# Patient Record
Sex: FEMALE | Race: WHITE | NOT HISPANIC OR LATINO | URBAN - METROPOLITAN AREA
[De-identification: names, ages, dates, MRNs, and addresses within clinical notes are randomized per-mention and may not be internally consistent; named-entity substitution may affect disease eponyms.]

---

## 2019-11-02 ENCOUNTER — HOSPITAL ENCOUNTER (EMERGENCY)
Facility: HOSPITAL | Age: 24
Discharge: HOME | End: 2019-11-02
Attending: EMERGENCY MEDICINE
Payer: OTHER GOVERNMENT

## 2019-11-02 VITALS
RESPIRATION RATE: 20 BRPM | TEMPERATURE: 97 F | SYSTOLIC BLOOD PRESSURE: 159 MMHG | HEART RATE: 97 BPM | WEIGHT: 240 LBS | DIASTOLIC BLOOD PRESSURE: 91 MMHG
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
## 2019-11-02 PROCEDURE — 99284 EMERGENCY DEPT VISIT MOD MDM: CPT

## 2019-11-02 RX ORDER — CLINDAMYCIN HYDROCHLORIDE 300 MG/1
300 CAPSULE ORAL EVERY 6 HOURS
Qty: 28 CAPSULE | Refills: 0 | Status: SHIPPED | OUTPATIENT
Start: 2019-11-02 | End: 2019-11-09

## 2019-11-02 RX ORDER — MUPIROCIN 20 MG/G
OINTMENT TOPICAL 3 TIMES DAILY
Qty: 15 G | Refills: 0 | OUTPATIENT
Start: 2019-11-02 | End: 2021-12-07

## 2019-11-02 NOTE — DISCHARGE INSTRUCTIONS
**Follow up with PCP in 24-48 hours. Return to ER with worsening of symptoms. Wash area twice a day with antibacterial soap and water. Apply antibiotic ointment three times a day. Keep area clean. Take all antibiotics. Monitor for signs of infection such as redness, swelling, purulent discharge, or fever.     **Over the counter tylenol or motrin as needed for pain and/or fever as directed on package insert. Drink plenty fluids. Get plenty rest. Wash hands frequently.     **Our goal in the emergency department is to always give you outstanding care and exceptional service. You may receive a survey by mail or e-mail in the next week regarding your experience in our ED. We would greatly appreciate your completing and returning the survey. Your feedback provides us with a way to recognize our staff who give very good care and it helps us learn how to improve when your experience was below our aspiration of excellence.

## 2019-11-02 NOTE — ED TRIAGE NOTES
24 y.o. female presents to ER ED 03 /ED 03A   Chief Complaint   Patient presents with    Insect Bite     abdomen   Pt reports possible insect bite first noticed last Saturday on her abdomen. Pt reports the bite opened up two days ago, and now looks like a hole. Pt is 1 month s/p  and is breast feeding. No acute distress noted.

## 2019-11-02 NOTE — ED PROVIDER NOTES
Encounter Date: 11/2/2019       History     Chief Complaint   Patient presents with    Insect Bite     abdomen     24-year-old female presents with possible insect bite to her left lower abdomen.  She reports that she first noted the wound 1 week ago.  She states that 2 days ago it began draining.  Reports mild erythema and tenderness with palpation.  No fever.    The history is provided by the patient.     Review of patient's allergies indicates:  No Known Allergies  No past medical history on file.  No past surgical history on file.  No family history on file.  Social History     Tobacco Use    Smoking status: Not on file   Substance Use Topics    Alcohol use: Not on file    Drug use: Not on file     Review of Systems   Constitutional: Negative for fever.   HENT: Negative for congestion, ear pain, rhinorrhea, sore throat and trouble swallowing.    Eyes: Negative for pain and redness.   Respiratory: Negative for cough and shortness of breath.    Cardiovascular: Negative for chest pain.   Gastrointestinal: Negative for abdominal pain.   Genitourinary: Negative for difficulty urinating and dysuria.   Musculoskeletal: Negative for arthralgias, back pain, myalgias and neck pain.   Skin: Positive for wound. Negative for rash.   Neurological: Negative for seizures, weakness and headaches.   Psychiatric/Behavioral: Negative.        Physical Exam     Initial Vitals [11/02/19 0921]   BP Pulse Resp Temp SpO2   (!) 159/91 97 20 97.2 °F (36.2 °C) --      MAP       --         Physical Exam    Nursing note and vitals reviewed.  Constitutional: No distress.   HENT:   Head: Normocephalic and atraumatic.   Right Ear: External ear normal.   Left Ear: External ear normal.   Nose: Nose normal.   Mouth/Throat: Oropharynx is clear and moist.   Eyes: Conjunctivae, EOM and lids are normal. Pupils are equal, round, and reactive to light.   Neck: Neck supple.   Cardiovascular: Normal rate, regular rhythm, S1 normal, S2 normal, normal  heart sounds and intact distal pulses.   Pulmonary/Chest: Effort normal and breath sounds normal. No respiratory distress.   Abdominal: Soft. Bowel sounds are normal. There is no tenderness.   Musculoskeletal: Normal range of motion.   Neurological: She is alert and oriented to person, place, and time. She has normal strength. GCS eye subscore is 4. GCS verbal subscore is 5. GCS motor subscore is 6.   Skin: Skin is warm and dry. Capillary refill takes less than 2 seconds. No rash noted. There is erythema.        Psychiatric: She has a normal mood and affect. Her speech is normal and behavior is normal.         ED Course   Procedures                                      Clinical Impression:       ICD-10-CM ICD-9-CM   1. Bug bite with infection, initial encounter W57.XXXA 919.5     E906.4     New Prescriptions    CLINDAMYCIN (CLEOCIN) 300 MG CAPSULE    Take 1 capsule (300 mg total) by mouth every 6 (six) hours. for 7 days    MUPIROCIN (BACTROBAN) 2 % OINTMENT    Apply topically 3 (three) times daily.       Disposition:   Disposition: Discharged  Condition: Stable    The patient acknowledges that close follow up with medical provider is required. Instructed to follow up with PCP within 2 days. Patient was given specific return precautions. The patient agrees to comply with all instruction and directions given in the ER.                         Kaylee Hendricks NP  11/02/19 0957

## 2021-05-09 ENCOUNTER — OFFICE VISIT (OUTPATIENT)
Dept: URGENT CARE | Facility: CLINIC | Age: 26
End: 2021-05-09
Payer: OTHER GOVERNMENT

## 2021-05-09 VITALS
HEART RATE: 88 BPM | SYSTOLIC BLOOD PRESSURE: 150 MMHG | WEIGHT: 225 LBS | HEIGHT: 64 IN | OXYGEN SATURATION: 98 % | TEMPERATURE: 99 F | DIASTOLIC BLOOD PRESSURE: 73 MMHG | BODY MASS INDEX: 38.41 KG/M2 | RESPIRATION RATE: 16 BRPM

## 2021-05-09 DIAGNOSIS — R09.81 NASAL CONGESTION: ICD-10-CM

## 2021-05-09 DIAGNOSIS — R05.9 COUGH: Primary | ICD-10-CM

## 2021-05-09 DIAGNOSIS — J30.9 ALLERGIC RHINITIS WITH POSTNASAL DRIP: ICD-10-CM

## 2021-05-09 DIAGNOSIS — R09.82 ALLERGIC RHINITIS WITH POSTNASAL DRIP: ICD-10-CM

## 2021-05-09 DIAGNOSIS — J01.00 ACUTE MAXILLARY SINUSITIS, RECURRENCE NOT SPECIFIED: ICD-10-CM

## 2021-05-09 DIAGNOSIS — J02.9 SORE THROAT: ICD-10-CM

## 2021-05-09 LAB
CTP QC/QA: YES
CTP QC/QA: YES
MOLECULAR STREP A: NEGATIVE
SARS-COV-2 RDRP RESP QL NAA+PROBE: NEGATIVE

## 2021-05-09 PROCEDURE — U0002 COVID-19 LAB TEST NON-CDC: HCPCS | Mod: QW,S$GLB,, | Performed by: PHYSICIAN ASSISTANT

## 2021-05-09 PROCEDURE — 99203 PR OFFICE/OUTPT VISIT, NEW, LEVL III, 30-44 MIN: ICD-10-PCS | Mod: S$GLB,,, | Performed by: PHYSICIAN ASSISTANT

## 2021-05-09 PROCEDURE — 87651 POCT STREP A MOLECULAR: ICD-10-PCS | Mod: QW,S$GLB,, | Performed by: PHYSICIAN ASSISTANT

## 2021-05-09 PROCEDURE — U0002: ICD-10-PCS | Mod: QW,S$GLB,, | Performed by: PHYSICIAN ASSISTANT

## 2021-05-09 PROCEDURE — 87651 STREP A DNA AMP PROBE: CPT | Mod: QW,S$GLB,, | Performed by: PHYSICIAN ASSISTANT

## 2021-05-09 PROCEDURE — 99203 OFFICE O/P NEW LOW 30 MIN: CPT | Mod: S$GLB,,, | Performed by: PHYSICIAN ASSISTANT

## 2021-05-09 RX ORDER — CETIRIZINE HYDROCHLORIDE 10 MG/1
10 TABLET ORAL DAILY
Qty: 30 TABLET | Refills: 0 | OUTPATIENT
Start: 2021-05-09 | End: 2021-12-07

## 2021-05-09 RX ORDER — FLUTICASONE PROPIONATE 50 MCG
1 SPRAY, SUSPENSION (ML) NASAL DAILY
Qty: 11.1 ML | Refills: 0 | OUTPATIENT
Start: 2021-05-09 | End: 2021-12-07

## 2021-05-09 RX ORDER — BENZONATATE 200 MG/1
200 CAPSULE ORAL 3 TIMES DAILY PRN
Qty: 30 CAPSULE | Refills: 0 | Status: SHIPPED | OUTPATIENT
Start: 2021-05-09 | End: 2021-05-19

## 2021-05-09 RX ORDER — PREDNISONE 20 MG/1
20 TABLET ORAL DAILY
Qty: 3 TABLET | Refills: 0 | Status: SHIPPED | OUTPATIENT
Start: 2021-05-09 | End: 2021-05-12

## 2021-11-29 ENCOUNTER — OFFICE VISIT (OUTPATIENT)
Dept: URGENT CARE | Facility: CLINIC | Age: 26
End: 2021-11-29
Payer: OTHER GOVERNMENT

## 2021-11-29 VITALS
DIASTOLIC BLOOD PRESSURE: 85 MMHG | RESPIRATION RATE: 18 BRPM | TEMPERATURE: 98 F | SYSTOLIC BLOOD PRESSURE: 145 MMHG | HEIGHT: 64 IN | HEART RATE: 84 BPM | WEIGHT: 217 LBS | BODY MASS INDEX: 37.05 KG/M2 | OXYGEN SATURATION: 97 %

## 2021-11-29 DIAGNOSIS — J02.9 SORE THROAT: ICD-10-CM

## 2021-11-29 DIAGNOSIS — R52 BODY ACHES: ICD-10-CM

## 2021-11-29 DIAGNOSIS — H92.03 OTALGIA OF BOTH EARS: ICD-10-CM

## 2021-11-29 DIAGNOSIS — J06.9 UPPER RESPIRATORY TRACT INFECTION, UNSPECIFIED TYPE: Primary | ICD-10-CM

## 2021-11-29 DIAGNOSIS — R05.9 COUGH: ICD-10-CM

## 2021-11-29 DIAGNOSIS — R09.81 SINUS CONGESTION: ICD-10-CM

## 2021-11-29 DIAGNOSIS — R09.81 NASAL CONGESTION: ICD-10-CM

## 2021-11-29 LAB
CTP QC/QA: YES
CTP QC/QA: YES
POC MOLECULAR INFLUENZA A AGN: NEGATIVE
POC MOLECULAR INFLUENZA B AGN: NEGATIVE
SARS-COV-2 RDRP RESP QL NAA+PROBE: NEGATIVE

## 2021-11-29 PROCEDURE — 99213 OFFICE O/P EST LOW 20 MIN: CPT | Mod: S$GLB,,, | Performed by: PHYSICIAN ASSISTANT

## 2021-11-29 PROCEDURE — U0002 COVID-19 LAB TEST NON-CDC: HCPCS | Mod: QW,S$GLB,, | Performed by: PHYSICIAN ASSISTANT

## 2021-11-29 PROCEDURE — U0002: ICD-10-PCS | Mod: QW,S$GLB,, | Performed by: PHYSICIAN ASSISTANT

## 2021-11-29 PROCEDURE — 87502 POCT INFLUENZA A/B MOLECULAR: ICD-10-PCS | Mod: QW,S$GLB,, | Performed by: PHYSICIAN ASSISTANT

## 2021-11-29 PROCEDURE — 99213 PR OFFICE/OUTPT VISIT, EST, LEVL III, 20-29 MIN: ICD-10-PCS | Mod: S$GLB,,, | Performed by: PHYSICIAN ASSISTANT

## 2021-11-29 PROCEDURE — 87502 INFLUENZA DNA AMP PROBE: CPT | Mod: QW,S$GLB,, | Performed by: PHYSICIAN ASSISTANT

## 2021-11-29 RX ORDER — BROMPHENIRAMINE MALEATE, PSEUDOEPHEDRINE HYDROCHLORIDE, AND DEXTROMETHORPHAN HYDROBROMIDE 2; 30; 10 MG/5ML; MG/5ML; MG/5ML
10 SYRUP ORAL EVERY 4 HOURS PRN
Qty: 118 ML | Refills: 0 | Status: SHIPPED | OUTPATIENT
Start: 2021-11-29 | End: 2021-12-09

## 2021-12-07 ENCOUNTER — HOSPITAL ENCOUNTER (EMERGENCY)
Facility: HOSPITAL | Age: 26
Discharge: HOME | End: 2021-12-07
Attending: STUDENT IN AN ORGANIZED HEALTH CARE EDUCATION/TRAINING PROGRAM
Payer: OTHER GOVERNMENT

## 2021-12-07 VITALS
BODY MASS INDEX: 37.16 KG/M2 | HEART RATE: 108 BPM | WEIGHT: 216.5 LBS | SYSTOLIC BLOOD PRESSURE: 157 MMHG | OXYGEN SATURATION: 97 % | DIASTOLIC BLOOD PRESSURE: 98 MMHG | RESPIRATION RATE: 18 BRPM | TEMPERATURE: 98 F

## 2021-12-07 DIAGNOSIS — J02.0 STREP PHARYNGITIS: Primary | ICD-10-CM

## 2021-12-07 LAB
GROUP A STREP, MOLECULAR: POSITIVE
INFLUENZA A, MOLECULAR: NEGATIVE
INFLUENZA B, MOLECULAR: NEGATIVE
SARS-COV-2 RDRP RESP QL NAA+PROBE: NEGATIVE
SPECIMEN SOURCE: NORMAL

## 2021-12-07 PROCEDURE — 99284 EMERGENCY DEPT VISIT MOD MDM: CPT | Mod: 25

## 2021-12-07 PROCEDURE — 63600175 PHARM REV CODE 636 W HCPCS: Performed by: STUDENT IN AN ORGANIZED HEALTH CARE EDUCATION/TRAINING PROGRAM

## 2021-12-07 PROCEDURE — 87651 STREP A DNA AMP PROBE: CPT | Performed by: STUDENT IN AN ORGANIZED HEALTH CARE EDUCATION/TRAINING PROGRAM

## 2021-12-07 PROCEDURE — 96372 THER/PROPH/DIAG INJ SC/IM: CPT

## 2021-12-07 PROCEDURE — U0002 COVID-19 LAB TEST NON-CDC: HCPCS | Performed by: STUDENT IN AN ORGANIZED HEALTH CARE EDUCATION/TRAINING PROGRAM

## 2021-12-07 PROCEDURE — 87502 INFLUENZA DNA AMP PROBE: CPT | Performed by: STUDENT IN AN ORGANIZED HEALTH CARE EDUCATION/TRAINING PROGRAM

## 2021-12-07 RX ORDER — DEXAMETHASONE SODIUM PHOSPHATE 4 MG/ML
10 INJECTION, SOLUTION INTRA-ARTICULAR; INTRALESIONAL; INTRAMUSCULAR; INTRAVENOUS; SOFT TISSUE
Status: COMPLETED | OUTPATIENT
Start: 2021-12-07 | End: 2021-12-07

## 2021-12-07 RX ADMIN — DEXAMETHASONE SODIUM PHOSPHATE 10 MG: 4 INJECTION, SOLUTION INTRA-ARTICULAR; INTRALESIONAL; INTRAMUSCULAR; INTRAVENOUS; SOFT TISSUE at 10:12

## 2021-12-07 RX ADMIN — PENICILLIN G BENZATHINE 1.2 MILLION UNITS: 1200000 INJECTION, SUSPENSION INTRAMUSCULAR at 10:12

## 2022-09-03 ENCOUNTER — HOSPITAL ENCOUNTER (EMERGENCY)
Facility: HOSPITAL | Age: 27
Discharge: HOME | End: 2022-09-04
Attending: STUDENT IN AN ORGANIZED HEALTH CARE EDUCATION/TRAINING PROGRAM
Payer: OTHER GOVERNMENT

## 2022-09-03 DIAGNOSIS — R19.7 NAUSEA VOMITING AND DIARRHEA: Primary | ICD-10-CM

## 2022-09-03 DIAGNOSIS — R11.2 NAUSEA VOMITING AND DIARRHEA: Primary | ICD-10-CM

## 2022-09-03 LAB
ALBUMIN SERPL BCP-MCNC: 4.5 G/DL (ref 3.5–5.2)
ALP SERPL-CCNC: 61 U/L (ref 55–135)
ALT SERPL W/O P-5'-P-CCNC: 63 U/L (ref 10–44)
ANION GAP SERPL CALC-SCNC: 15 MMOL/L (ref 8–16)
AST SERPL-CCNC: 29 U/L (ref 10–40)
B-HCG UR QL: NEGATIVE
BASOPHILS NFR BLD: 0 % (ref 0–1.9)
BILIRUB SERPL-MCNC: 0.8 MG/DL (ref 0.1–1)
BILIRUB UR QL STRIP: NEGATIVE
BUN SERPL-MCNC: 11 MG/DL (ref 6–20)
CALCIUM SERPL-MCNC: 9.7 MG/DL (ref 8.7–10.5)
CHLORIDE SERPL-SCNC: 104 MMOL/L (ref 95–110)
CLARITY UR: CLEAR
CO2 SERPL-SCNC: 21 MMOL/L (ref 23–29)
COLOR UR: YELLOW
CREAT SERPL-MCNC: 0.9 MG/DL (ref 0.5–1.4)
DIFFERENTIAL METHOD: ABNORMAL
EOSINOPHIL NFR BLD: 1 % (ref 0–8)
ERYTHROCYTE [DISTWIDTH] IN BLOOD BY AUTOMATED COUNT: 12.5 % (ref 11.5–14.5)
EST. GFR  (NO RACE VARIABLE): >60 ML/MIN/1.73 M^2
GLUCOSE SERPL-MCNC: 125 MG/DL (ref 70–110)
GLUCOSE UR QL STRIP: NEGATIVE
HCT VFR BLD AUTO: 49.7 % (ref 37–48.5)
HGB BLD-MCNC: 18 G/DL (ref 12–16)
HGB UR QL STRIP: NEGATIVE
IMM GRANULOCYTES # BLD AUTO: ABNORMAL K/UL (ref 0–0.04)
IMM GRANULOCYTES NFR BLD AUTO: ABNORMAL % (ref 0–0.5)
INFLUENZA A, MOLECULAR: NEGATIVE
INFLUENZA B, MOLECULAR: NEGATIVE
KETONES UR QL STRIP: ABNORMAL
LACTATE SERPL-SCNC: 1.4 MMOL/L (ref 0.5–2.2)
LEUKOCYTE ESTERASE UR QL STRIP: NEGATIVE
LIPASE SERPL-CCNC: 24 U/L (ref 4–60)
LYMPHOCYTES NFR BLD: 11 % (ref 18–48)
MCH RBC QN AUTO: 33 PG (ref 27–31)
MCHC RBC AUTO-ENTMCNC: 36.2 G/DL (ref 32–36)
MCV RBC AUTO: 91 FL (ref 82–98)
MONOCYTES NFR BLD: 5 % (ref 4–15)
NEUTROPHILS NFR BLD: 75 % (ref 38–73)
NEUTS BAND NFR BLD MANUAL: 8 %
NITRITE UR QL STRIP: NEGATIVE
NRBC BLD-RTO: 0 /100 WBC
PH UR STRIP: 6 [PH] (ref 5–8)
PLATELET # BLD AUTO: 299 K/UL (ref 150–450)
PLATELET BLD QL SMEAR: ABNORMAL
PMV BLD AUTO: 9.9 FL (ref 9.2–12.9)
POLYCHROMASIA BLD QL SMEAR: ABNORMAL
POTASSIUM SERPL-SCNC: 4 MMOL/L (ref 3.5–5.1)
PROT SERPL-MCNC: 7.6 G/DL (ref 6–8.4)
PROT UR QL STRIP: NEGATIVE
RBC # BLD AUTO: 5.45 M/UL (ref 4–5.4)
SARS-COV-2 RDRP RESP QL NAA+PROBE: NEGATIVE
SODIUM SERPL-SCNC: 140 MMOL/L (ref 136–145)
SP GR UR STRIP: 1.02 (ref 1–1.03)
SPECIMEN SOURCE: NORMAL
SPHEROCYTES BLD QL SMEAR: ABNORMAL
STOMATOCYTES BLD QL SMEAR: PRESENT
URN SPEC COLLECT METH UR: ABNORMAL
UROBILINOGEN UR STRIP-ACNC: NEGATIVE EU/DL
WBC # BLD AUTO: 25.07 K/UL (ref 3.9–12.7)

## 2022-09-03 PROCEDURE — U0002 COVID-19 LAB TEST NON-CDC: HCPCS | Performed by: NURSE PRACTITIONER

## 2022-09-03 PROCEDURE — 25000003 PHARM REV CODE 250: Performed by: NURSE PRACTITIONER

## 2022-09-03 PROCEDURE — 83605 ASSAY OF LACTIC ACID: CPT | Performed by: STUDENT IN AN ORGANIZED HEALTH CARE EDUCATION/TRAINING PROGRAM

## 2022-09-03 PROCEDURE — 25500020 PHARM REV CODE 255: Performed by: STUDENT IN AN ORGANIZED HEALTH CARE EDUCATION/TRAINING PROGRAM

## 2022-09-03 PROCEDURE — 87040 BLOOD CULTURE FOR BACTERIA: CPT | Performed by: STUDENT IN AN ORGANIZED HEALTH CARE EDUCATION/TRAINING PROGRAM

## 2022-09-03 PROCEDURE — 81025 URINE PREGNANCY TEST: CPT | Performed by: NURSE PRACTITIONER

## 2022-09-03 PROCEDURE — 83690 ASSAY OF LIPASE: CPT | Performed by: NURSE PRACTITIONER

## 2022-09-03 PROCEDURE — 85027 COMPLETE CBC AUTOMATED: CPT | Performed by: NURSE PRACTITIONER

## 2022-09-03 PROCEDURE — 80053 COMPREHEN METABOLIC PANEL: CPT | Performed by: NURSE PRACTITIONER

## 2022-09-03 PROCEDURE — 96375 TX/PRO/DX INJ NEW DRUG ADDON: CPT

## 2022-09-03 PROCEDURE — 63600175 PHARM REV CODE 636 W HCPCS: Performed by: NURSE PRACTITIONER

## 2022-09-03 PROCEDURE — 85007 BL SMEAR W/DIFF WBC COUNT: CPT | Performed by: NURSE PRACTITIONER

## 2022-09-03 PROCEDURE — 99285 EMERGENCY DEPT VISIT HI MDM: CPT | Mod: 25

## 2022-09-03 PROCEDURE — 36415 COLL VENOUS BLD VENIPUNCTURE: CPT | Performed by: NURSE PRACTITIONER

## 2022-09-03 PROCEDURE — 63600175 PHARM REV CODE 636 W HCPCS: Performed by: STUDENT IN AN ORGANIZED HEALTH CARE EDUCATION/TRAINING PROGRAM

## 2022-09-03 PROCEDURE — 81003 URINALYSIS AUTO W/O SCOPE: CPT | Performed by: NURSE PRACTITIONER

## 2022-09-03 PROCEDURE — 96361 HYDRATE IV INFUSION ADD-ON: CPT

## 2022-09-03 PROCEDURE — 87502 INFLUENZA DNA AMP PROBE: CPT | Performed by: NURSE PRACTITIONER

## 2022-09-03 PROCEDURE — 96374 THER/PROPH/DIAG INJ IV PUSH: CPT | Mod: 59

## 2022-09-03 RX ORDER — ONDANSETRON 2 MG/ML
4 INJECTION INTRAMUSCULAR; INTRAVENOUS
Status: COMPLETED | OUTPATIENT
Start: 2022-09-03 | End: 2022-09-03

## 2022-09-03 RX ORDER — DICYCLOMINE HYDROCHLORIDE 20 MG/1
20 TABLET ORAL 2 TIMES DAILY
Qty: 20 TABLET | Refills: 0 | Status: SHIPPED | OUTPATIENT
Start: 2022-09-03

## 2022-09-03 RX ORDER — NALOXONE HCL 0.4 MG/ML
VIAL (ML) INJECTION
Status: DISCONTINUED
Start: 2022-09-03 | End: 2022-09-04 | Stop reason: HOSPADM

## 2022-09-03 RX ORDER — MORPHINE SULFATE 4 MG/ML
4 INJECTION, SOLUTION INTRAMUSCULAR; INTRAVENOUS
Status: COMPLETED | OUTPATIENT
Start: 2022-09-03 | End: 2022-09-03

## 2022-09-03 RX ORDER — METOCLOPRAMIDE HYDROCHLORIDE 5 MG/ML
10 INJECTION INTRAMUSCULAR; INTRAVENOUS
Status: COMPLETED | OUTPATIENT
Start: 2022-09-03 | End: 2022-09-03

## 2022-09-03 RX ORDER — ONDANSETRON 4 MG/1
4 TABLET, ORALLY DISINTEGRATING ORAL EVERY 6 HOURS PRN
Qty: 20 TABLET | Refills: 0 | Status: SHIPPED | OUTPATIENT
Start: 2022-09-03

## 2022-09-03 RX ADMIN — SODIUM CHLORIDE 1000 ML: 0.9 INJECTION, SOLUTION INTRAVENOUS at 09:09

## 2022-09-03 RX ADMIN — ONDANSETRON HYDROCHLORIDE 4 MG: 2 SOLUTION INTRAMUSCULAR; INTRAVENOUS at 09:09

## 2022-09-03 RX ADMIN — METOCLOPRAMIDE 10 MG: 5 INJECTION, SOLUTION INTRAMUSCULAR; INTRAVENOUS at 11:09

## 2022-09-03 RX ADMIN — MORPHINE SULFATE 4 MG: 4 INJECTION INTRAVENOUS at 10:09

## 2022-09-03 RX ADMIN — IOHEXOL 100 ML: 350 INJECTION, SOLUTION INTRAVENOUS at 10:09

## 2022-09-04 VITALS
WEIGHT: 222.69 LBS | TEMPERATURE: 98 F | OXYGEN SATURATION: 96 % | RESPIRATION RATE: 16 BRPM | DIASTOLIC BLOOD PRESSURE: 72 MMHG | HEART RATE: 99 BPM | SYSTOLIC BLOOD PRESSURE: 165 MMHG

## 2022-09-04 NOTE — ED PROVIDER NOTES
Encounter Date: 9/3/2022    This document was partially completed using speech recognition software and may contain misspellings, grammatical errors, and/or unexpected word substitutions.       History     Chief Complaint   Patient presents with    Emesis     Patient to ER CC of vomiting, diarrhea, abd pain, and chills started a few hours ago      27 y.o. female with no significant PMHx presents to the ED for evaluation nausea, vomiting, and diarrhea.  Patient reports that symptoms started acutely today.  She reports multiple episodes of bilious, nonbloody emesis in addition to loose, nonbloody stool.  She also reports associated abdominal cramping, mainly in the upper area of her abdomen.  Cramping is intermittent, generally occurs prior loose stool. She denies urinary symptoms.  Denies fever, chills, or body aches. Denies recent use of antibiotics, consumption of undercooked foods, or recent travels. Denies pregnancy. Reports no known exposure to sick contacts.    The history is provided by the patient.   Review of patient's allergies indicates:  No Known Allergies  History reviewed. No pertinent past medical history.  History reviewed. No pertinent surgical history.  History reviewed. No pertinent family history.     Review of Systems   Constitutional:  Positive for appetite change. Negative for activity change, chills and fever.   HENT:  Negative for congestion, ear discharge, ear pain, postnasal drip, rhinorrhea, sinus pressure, sinus pain, sneezing and sore throat.    Eyes:  Negative for discharge and redness.   Respiratory:  Negative for cough, chest tightness and shortness of breath.    Cardiovascular:  Negative for chest pain and palpitations.   Gastrointestinal:  Positive for abdominal pain, diarrhea, nausea and vomiting. Negative for abdominal distention.   Genitourinary:  Negative for dysuria, frequency, urgency, vaginal bleeding and vaginal discharge.   Musculoskeletal:  Negative for back pain and neck  pain.   Skin:  Negative for rash and wound.   Neurological:  Negative for dizziness, weakness, light-headedness, numbness and headaches.   Hematological:  Does not bruise/bleed easily.     Physical Exam     Initial Vitals [09/03/22 2052]   BP Pulse Resp Temp SpO2   (!) 139/90 (!) 117 18 97.9 °F (36.6 °C) 100 %      MAP       --         Physical Exam    Nursing note and vitals reviewed.  Constitutional: She appears well-developed and well-nourished. She is not diaphoretic. No distress.   HENT:   Head: Normocephalic and atraumatic.   Right Ear: Tympanic membrane, external ear and ear canal normal. Tympanic membrane is not erythematous. No middle ear effusion.   Left Ear: Tympanic membrane, external ear and ear canal normal. Tympanic membrane is not erythematous.  No middle ear effusion.   Nose: Nose normal.   Mouth/Throat: Uvula is midline, oropharynx is clear and moist and mucous membranes are normal. Mucous membranes are not pale and not dry.   Eyes: Conjunctivae and EOM are normal. Pupils are equal, round, and reactive to light. Right eye exhibits no discharge. Left eye exhibits no discharge. No scleral icterus.   Neck: Neck supple.   Normal range of motion.  Cardiovascular:  Regular rhythm, normal heart sounds and intact distal pulses.   Tachycardia present.         Pulmonary/Chest: Effort normal and breath sounds normal. No stridor. No respiratory distress. She has no decreased breath sounds. She has no wheezes. She has no rhonchi. She has no rales.   Abdominal: Abdomen is soft. Bowel sounds are normal. There is abdominal tenderness in the epigastric area. There is no guarding.   Musculoskeletal:         General: No edema. Normal range of motion.      Cervical back: Normal range of motion and neck supple.     Neurological: She is alert and oriented to person, place, and time. She has normal strength. She displays normal reflexes. No cranial nerve deficit or sensory deficit.   Skin: Skin is warm and dry. Capillary  refill takes less than 2 seconds. No rash noted.   Psychiatric: She has a normal mood and affect. Her behavior is normal. Judgment and thought content normal.       ED Course   Procedures  Labs Reviewed   CBC W/ AUTO DIFFERENTIAL - Abnormal; Notable for the following components:       Result Value    WBC 25.07 (*)     RBC 5.45 (*)     Hemoglobin 18.0 (*)     Hematocrit 49.7 (*)     MCH 33.0 (*)     MCHC 36.2 (*)     Gran % 75.0 (*)     Lymph % 11.0 (*)     All other components within normal limits   COMPREHENSIVE METABOLIC PANEL - Abnormal; Notable for the following components:    CO2 21 (*)     Glucose 125 (*)     ALT 63 (*)     All other components within normal limits   URINALYSIS, REFLEX TO URINE CULTURE - Abnormal; Notable for the following components:    Ketones, UA 2+ (*)     All other components within normal limits    Narrative:     Specimen Source->Urine   INFLUENZA A & B BY MOLECULAR   CULTURE, BLOOD   CULTURE, BLOOD   LIPASE   PREGNANCY TEST, URINE RAPID    Narrative:     Specimen Source->Urine   SARS-COV-2 RNA AMPLIFICATION, QUAL   LACTIC ACID, PLASMA          Imaging Results              CT Abdomen Pelvis With Contrast (Final result)  Result time 09/03/22 22:58:56      Final result by Saúl Browne DO (09/03/22 22:58:56)                   Impression:      No acute abnormality of the abdomen or pelvis.    Hepatic steatosis and hepatomegaly.      Electronically signed by: Saúl Browne  Date:    09/03/2022  Time:    22:58               Narrative:    EXAMINATION:  CT ABDOMEN PELVIS WITH CONTRAST    CLINICAL HISTORY:  Abdominal abscess/infection suspected;    TECHNIQUE:  Axial CT images with sagittal and coronal reformats were obtained of the abdomen and pelvis from the hemidiaphragms through the symphysis pubis after the administration of 100mL Omnipaque 350.    COMPARISON:  None available.    FINDINGS:  Lung Bases: Clear.    Heart: Heart size is normal.  No pericardial effusion.    Liver: The liver  is enlarged and demonstrates diffuse hypoattenuation which can be seen with hepatic steatosis.  There are no focal hepatic lesions.  The portal vasculature is patent.    Biliary tract: No intrahepatic or extrahepatic biliary ductal dilatation.    Gallbladder: No radiodense gallstone. No wall thickening or pericholecystic fluid.    Pancreas: Normal. No pancreatic ductal dilatation.    Spleen: Normal size without focal lesion.    Adrenals: Unremarkable.    Kidneys and urinary collecting systems: Normal.  No hydronephrosis or urolithiasis.    Lymph nodes: None enlarged.    Stomach and bowel: The stomach is normal.  Loops of small and large bowel are normal in caliber without evidence for inflammation or obstruction.    Peritoneum and mesentery: No ascites or free intraperitoneal air.  No abdominal fluid collection.    Vasculature: No aneurysm or significant atherosclerosis.    Urinary bladder: No wall thickening.    Reproductive organs: The uterus and adnexae are unremarkable.    Body wall: No abnormality.    Musculoskeletal: No aggressive osseous lesion.                                       Medications   metoclopramide HCl injection 10 mg (has no administration in time range)   sodium chloride 0.9% bolus 1,000 mL (1,000 mLs Intravenous New Bag 9/3/22 2123)   ondansetron injection 4 mg (4 mg Intravenous Given 9/3/22 2123)   morphine injection 4 mg (4 mg Intravenous Given 9/3/22 2233)   iohexoL (OMNIPAQUE 350) injection 100 mL (100 mLs Intravenous Given 9/3/22 2252)     Medical Decision Making:   Differential Diagnosis:   Ddx: viral gastroenteritis, pancreatitis, COVID-19, influenza, UTI, GERD, peptic ulcer disease, colitis  ED Management:  Based on the patient's evaluation - patient appears well for discharge home. Hemoconcentration possibly on CBC - negative CT scan, lactic. Doesn't appear septic. Ketones on UA. [place] better with fluids, antiemetics. Was able to tolerate whole cup of ice water. Will discharge home  with supportive care, antiemetics. PCP f/u as needed. Patient is in agreement with the plan.                    Clinical Impression:   Final diagnoses:  [R11.2, R19.7] Nausea vomiting and diarrhea (Primary)      ED Disposition Condition    Discharge Stable          ED Prescriptions       Medication Sig Dispense Start Date End Date Auth. Provider    ondansetron (ZOFRAN-ODT) 4 MG TbDL Take 1 tablet (4 mg total) by mouth every 6 (six) hours as needed (nausea/vomiting). 20 tablet 9/3/2022 -- Flaco Quinonez DO    dicyclomine (BENTYL) 20 mg tablet Take 1 tablet (20 mg total) by mouth 2 (two) times daily. 20 tablet 9/3/2022 -- Flaco Quinonez DO          Follow-up Information       Follow up With Specialties Details Why Contact Info    Your primary care provider  Schedule an appointment as soon as possible for a visit in 1 week As needed              Flaco Quinonez DO  09/03/22 7749

## 2022-09-04 NOTE — ED NOTES
Narcan was overrided in pyxis for another pt in an emergent over dose. Pt was not registered at that time.

## 2022-09-09 LAB
BACTERIA BLD CULT: NORMAL
BACTERIA BLD CULT: NORMAL

## 2022-10-26 ENCOUNTER — HOSPITAL ENCOUNTER (EMERGENCY)
Facility: HOSPITAL | Age: 27
Discharge: HOME | End: 2022-10-26
Attending: SURGERY
Payer: OTHER GOVERNMENT

## 2022-10-26 ENCOUNTER — NURSE TRIAGE (OUTPATIENT)
Dept: ADMINISTRATIVE | Facility: CLINIC | Age: 27
End: 2022-10-26
Payer: OTHER GOVERNMENT

## 2022-10-26 VITALS
SYSTOLIC BLOOD PRESSURE: 149 MMHG | BODY MASS INDEX: 38.14 KG/M2 | WEIGHT: 222.25 LBS | RESPIRATION RATE: 18 BRPM | DIASTOLIC BLOOD PRESSURE: 80 MMHG | TEMPERATURE: 99 F | OXYGEN SATURATION: 99 % | HEART RATE: 106 BPM

## 2022-10-26 DIAGNOSIS — H60.501 ACUTE OTITIS EXTERNA OF RIGHT EAR, UNSPECIFIED TYPE: Primary | ICD-10-CM

## 2022-10-26 DIAGNOSIS — H66.91 RIGHT OTITIS MEDIA, UNSPECIFIED OTITIS MEDIA TYPE: ICD-10-CM

## 2022-10-26 PROCEDURE — 63600175 PHARM REV CODE 636 W HCPCS: Performed by: SURGERY

## 2022-10-26 PROCEDURE — 99284 EMERGENCY DEPT VISIT MOD MDM: CPT

## 2022-10-26 PROCEDURE — 96372 THER/PROPH/DIAG INJ SC/IM: CPT | Performed by: SURGERY

## 2022-10-26 RX ORDER — MORPHINE SULFATE 2 MG/ML
2 INJECTION, SOLUTION INTRAMUSCULAR; INTRAVENOUS
Status: COMPLETED | OUTPATIENT
Start: 2022-10-26 | End: 2022-10-26

## 2022-10-26 RX ORDER — ONDANSETRON 2 MG/ML
4 INJECTION INTRAMUSCULAR; INTRAVENOUS
Status: COMPLETED | OUTPATIENT
Start: 2022-10-26 | End: 2022-10-26

## 2022-10-26 RX ORDER — NEOMYCIN SULFATE, POLYMYXIN B SULFATE AND HYDROCORTISONE 10; 3.5; 1 MG/ML; MG/ML; [USP'U]/ML
4 SUSPENSION/ DROPS AURICULAR (OTIC) 3 TIMES DAILY
Qty: 8 ML | Refills: 0 | Status: SHIPPED | OUTPATIENT
Start: 2022-10-26 | End: 2022-11-05

## 2022-10-26 RX ORDER — AMOXICILLIN 875 MG/1
875 TABLET, FILM COATED ORAL 2 TIMES DAILY
Qty: 14 TABLET | Refills: 0 | Status: SHIPPED | OUTPATIENT
Start: 2022-10-26 | End: 2022-11-02

## 2022-10-26 RX ORDER — HYDROCODONE BITARTRATE AND ACETAMINOPHEN 5; 325 MG/1; MG/1
1 TABLET ORAL EVERY 4 HOURS PRN
Qty: 10 TABLET | Refills: 0 | Status: SHIPPED | OUTPATIENT
Start: 2022-10-26 | End: 2022-10-28

## 2022-10-26 RX ADMIN — MORPHINE SULFATE 2 MG: 2 INJECTION, SOLUTION INTRAMUSCULAR; INTRAVENOUS at 08:10

## 2022-10-26 RX ADMIN — PENICILLIN G BENZATHINE 1.2 MILLION UNITS: 1200000 INJECTION, SUSPENSION INTRAMUSCULAR at 08:10

## 2022-10-26 RX ADMIN — ONDANSETRON 4 MG: 2 INJECTION INTRAMUSCULAR; INTRAVENOUS at 08:10

## 2022-10-27 NOTE — TELEPHONE ENCOUNTER
Pt reports having severe ear pain that is radiating down her neck, Pt advised to be seen within 4 hours per protocol via ED/UC/OCA brian, Pt encouraged to call back with any worsening symptoms or questions and verbalized understanding.    Reason for Disposition   [1] SEVERE pain AND [2] not improved 2 hours after taking analgesic medication (e.g., ibuprofen or acetaminophen)    Additional Information   Negative: [1] Stiff neck (can't touch chin to chest) AND [2] fever   Negative: [1] Bony area of skull behind the ear is pink or swollen AND [2] fever   Negative: Fever > 104 F (40 C)   Negative: Patient sounds very sick or weak to the triager    Protocols used: Earache-A-AH

## 2022-10-27 NOTE — ED PROVIDER NOTES
Encounter Date: 10/26/2022       History     Chief Complaint   Patient presents with    Otalgia     Patient to ER with bilateral ear pain, she reports that it feels to be spreading to her throat      27-year-old female presents with right earache this evening  Patient has no ear drainage with normal hearing on assessment  No obvious signs of distress, no nasal congestion or sore throat  Only complaint is right earache with otitis media/externa noted    Review of patient's allergies indicates:  No Known Allergies  No past medical history on file.  Past Surgical History:   Procedure Laterality Date     SECTION       Family History   Problem Relation Age of Onset    No Known Problems Mother     No Known Problems Father      Social History     Tobacco Use    Smokeless tobacco: Never   Substance Use Topics    Alcohol use: Not Currently     Review of Systems   Constitutional: Negative.    HENT:  Positive for ear pain. Negative for ear discharge.    Eyes: Negative.    Respiratory: Negative.     Cardiovascular: Negative.    Gastrointestinal: Negative.    Genitourinary: Negative.    Musculoskeletal: Negative.    Skin: Negative.    Neurological: Negative.    Psychiatric/Behavioral: Negative.       Physical Exam     Initial Vitals [10/26/22 2017]   BP Pulse Resp Temp SpO2   (!) 149/80 106 18 98.7 °F (37.1 °C) 99 %      MAP       --         Physical Exam    Nursing note and vitals reviewed.  Constitutional: She appears well-developed.   HENT:   Head: Normocephalic and atraumatic.   Left Ear: External ear normal.   Nose: Nose normal.   Mouth/Throat: Oropharynx is clear and moist.   Right otitis media & right otitis externa   Eyes: Conjunctivae and EOM are normal. Pupils are equal, round, and reactive to light.   Neck: Neck supple. No JVD present.   Normal range of motion.  Cardiovascular:  Normal rate and regular rhythm.           Pulmonary/Chest: No respiratory distress. She has no wheezes. She has no rhonchi. She has  no rales. She exhibits no tenderness.   Abdominal: Abdomen is soft. Bowel sounds are normal. She exhibits no distension. There is no abdominal tenderness. There is no rebound.   Musculoskeletal:         General: Normal range of motion.      Cervical back: Normal range of motion and neck supple.     Neurological: She is alert and oriented to person, place, and time. She has normal strength and normal reflexes.   Skin: Skin is warm and dry.       ED Course   Procedures  Labs Reviewed - No data to display       Imaging Results    None          Medications   penicillin G benzathine (BICILLIN LA) injection 1.2 Million Units (1.2 Million Units Intramuscular Given 10/26/22 2035)   morphine injection 2 mg (2 mg Intramuscular Given 10/26/22 2030)   ondansetron injection 4 mg (4 mg Intramuscular Given 10/26/22 2035)     Medical Decision Making:   Initial Assessment:   Right earache for last couple days now    Differential Diagnosis:   Otitis media, otitis externa, mastoiditis, URI, flu, strep    ED Management:  Patient has right otitis media & otitis externa on ER evaluation today  Patient will prescribe antibiotics & otic ear drops on ER discharge today  Pain control as well, PCP & ENT follow-up in next 48 hours  Return with any concerns on ER discharge today return with any concerns                        Clinical Impression:   Final diagnoses:  [H60.501] Acute otitis externa of right ear, unspecified type (Primary)  [H66.91] Right otitis media, unspecified otitis media type        ED Disposition Condition    Discharge Stable          ED Prescriptions       Medication Sig Dispense Start Date End Date Auth. Provider    HYDROcodone-acetaminophen (NORCO) 5-325 mg per tablet Take 1 tablet by mouth every 4 (four) hours as needed for Pain. 10 tablet 10/26/2022 10/28/2022 Luis Price MD    amoxicillin (AMOXIL) 875 MG tablet Take 1 tablet (875 mg total) by mouth 2 (two) times daily. for 7 days 14 tablet 10/26/2022 11/2/2022  Luis Price MD    neomycin-polymyxin-hydrocortisone (CORTISPORIN) 3.5-10,000-1 mg/mL-unit/mL-% otic suspension Place 4 drops into the right ear 3 (three) times daily. for 10 days 8 mL 10/26/2022 11/5/2022 Luis Price MD          Follow-up Information       Follow up With Specialties Details Why Contact Info    Litzy Ivy MD Internal Medicine Go in 2 days  [facility] [address]  [phone]               Luis Price MD  10/26/22 7954

## 2022-12-11 ENCOUNTER — HOSPITAL ENCOUNTER (EMERGENCY)
Facility: HOSPITAL | Age: 27
Discharge: HOME | End: 2022-12-11
Attending: SURGERY
Payer: OTHER GOVERNMENT

## 2022-12-11 VITALS
BODY MASS INDEX: 38.9 KG/M2 | TEMPERATURE: 97 F | SYSTOLIC BLOOD PRESSURE: 138 MMHG | RESPIRATION RATE: 16 BRPM | HEART RATE: 112 BPM | DIASTOLIC BLOOD PRESSURE: 94 MMHG | WEIGHT: 226.63 LBS | OXYGEN SATURATION: 97 %

## 2022-12-11 DIAGNOSIS — J00 ACUTE NASOPHARYNGITIS: ICD-10-CM

## 2022-12-11 DIAGNOSIS — F43.9 STRESS: Primary | ICD-10-CM

## 2022-12-11 DIAGNOSIS — H66.93 ACUTE OTITIS MEDIA, BILATERAL: ICD-10-CM

## 2022-12-11 LAB
GROUP A STREP, MOLECULAR: NEGATIVE
INFLUENZA A, MOLECULAR: NEGATIVE
INFLUENZA B, MOLECULAR: NEGATIVE
SARS-COV-2 RDRP RESP QL NAA+PROBE: NEGATIVE
SPECIMEN SOURCE: NORMAL

## 2022-12-11 PROCEDURE — 87651 STREP A DNA AMP PROBE: CPT | Performed by: SURGERY

## 2022-12-11 PROCEDURE — 87502 INFLUENZA DNA AMP PROBE: CPT | Performed by: SURGERY

## 2022-12-11 PROCEDURE — 96372 THER/PROPH/DIAG INJ SC/IM: CPT | Performed by: SURGERY

## 2022-12-11 PROCEDURE — 63600175 PHARM REV CODE 636 W HCPCS: Performed by: SURGERY

## 2022-12-11 PROCEDURE — 99284 EMERGENCY DEPT VISIT MOD MDM: CPT

## 2022-12-11 PROCEDURE — U0002 COVID-19 LAB TEST NON-CDC: HCPCS | Performed by: SURGERY

## 2022-12-11 RX ORDER — KETOROLAC TROMETHAMINE 30 MG/ML
60 INJECTION, SOLUTION INTRAMUSCULAR; INTRAVENOUS
Status: COMPLETED | OUTPATIENT
Start: 2022-12-11 | End: 2022-12-11

## 2022-12-11 RX ORDER — AMOXICILLIN 875 MG/1
875 TABLET, FILM COATED ORAL 2 TIMES DAILY
Qty: 14 TABLET | Refills: 0 | Status: SHIPPED | OUTPATIENT
Start: 2022-12-11 | End: 2022-12-18

## 2022-12-11 RX ORDER — IBUPROFEN 800 MG/1
800 TABLET ORAL EVERY 6 HOURS PRN
Qty: 20 TABLET | Refills: 0 | OUTPATIENT
Start: 2022-12-11 | End: 2023-10-06

## 2022-12-11 RX ADMIN — PENICILLIN G BENZATHINE 1.2 MILLION UNITS: 1200000 INJECTION, SUSPENSION INTRAMUSCULAR at 04:12

## 2022-12-11 RX ADMIN — KETOROLAC TROMETHAMINE 60 MG: 30 INJECTION, SOLUTION INTRAMUSCULAR at 04:12

## 2023-10-06 ENCOUNTER — HOSPITAL ENCOUNTER (EMERGENCY)
Facility: HOSPITAL | Age: 28
Discharge: HOME | End: 2023-10-06
Attending: SURGERY
Payer: OTHER GOVERNMENT

## 2023-10-06 VITALS
OXYGEN SATURATION: 99 % | TEMPERATURE: 98 F | DIASTOLIC BLOOD PRESSURE: 82 MMHG | WEIGHT: 219 LBS | HEIGHT: 64 IN | HEART RATE: 67 BPM | RESPIRATION RATE: 16 BRPM | BODY MASS INDEX: 37.39 KG/M2 | SYSTOLIC BLOOD PRESSURE: 125 MMHG

## 2023-10-06 DIAGNOSIS — M54.41 ACUTE RIGHT-SIDED LOW BACK PAIN WITH RIGHT-SIDED SCIATICA: Primary | ICD-10-CM

## 2023-10-06 LAB
AMPHET+METHAMPHET UR QL: NEGATIVE
B-HCG UR QL: NEGATIVE
BARBITURATES UR QL SCN>200 NG/ML: NEGATIVE
BENZODIAZ UR QL SCN>200 NG/ML: NEGATIVE
BILIRUB UR QL STRIP: NEGATIVE
BZE UR QL SCN: NEGATIVE
CANNABINOIDS UR QL SCN: ABNORMAL
CLARITY UR: CLEAR
COLOR UR: YELLOW
CREAT UR-MCNC: 23.7 MG/DL (ref 15–325)
GLUCOSE UR QL STRIP: NEGATIVE
HGB UR QL STRIP: NEGATIVE
KETONES UR QL STRIP: NEGATIVE
LEUKOCYTE ESTERASE UR QL STRIP: NEGATIVE
METHADONE UR QL SCN>300 NG/ML: NEGATIVE
NITRITE UR QL STRIP: NEGATIVE
OPIATES UR QL SCN: NEGATIVE
PCP UR QL SCN>25 NG/ML: NEGATIVE
PH UR STRIP: 7 [PH] (ref 5–8)
PROT UR QL STRIP: NEGATIVE
SP GR UR STRIP: 1.01 (ref 1–1.03)
TOXICOLOGY INFORMATION: ABNORMAL
URN SPEC COLLECT METH UR: NORMAL
UROBILINOGEN UR STRIP-ACNC: NEGATIVE EU/DL

## 2023-10-06 PROCEDURE — 81003 URINALYSIS AUTO W/O SCOPE: CPT | Performed by: SURGERY

## 2023-10-06 PROCEDURE — 99284 EMERGENCY DEPT VISIT MOD MDM: CPT

## 2023-10-06 PROCEDURE — 25000003 PHARM REV CODE 250: Performed by: SURGERY

## 2023-10-06 PROCEDURE — 81025 URINE PREGNANCY TEST: CPT | Performed by: SURGERY

## 2023-10-06 PROCEDURE — 80307 DRUG TEST PRSMV CHEM ANLYZR: CPT | Performed by: SURGERY

## 2023-10-06 RX ORDER — PREDNISONE 20 MG/1
40 TABLET ORAL DAILY
Qty: 10 TABLET | Refills: 0 | Status: SHIPPED | OUTPATIENT
Start: 2023-10-06 | End: 2023-10-11

## 2023-10-06 RX ORDER — GABAPENTIN 100 MG/1
100 CAPSULE ORAL 3 TIMES DAILY
Qty: 90 CAPSULE | Refills: 11 | Status: SHIPPED | OUTPATIENT
Start: 2023-10-06 | End: 2024-10-05

## 2023-10-06 RX ORDER — IBUPROFEN 800 MG/1
800 TABLET ORAL EVERY 6 HOURS PRN
Qty: 20 TABLET | Refills: 0 | Status: SHIPPED | OUTPATIENT
Start: 2023-10-06

## 2023-10-06 RX ORDER — IBUPROFEN 800 MG/1
800 TABLET ORAL
Status: COMPLETED | OUTPATIENT
Start: 2023-10-06 | End: 2023-10-06

## 2023-10-06 RX ORDER — ACETAMINOPHEN 500 MG
1000 TABLET ORAL
Status: COMPLETED | OUTPATIENT
Start: 2023-10-06 | End: 2023-10-06

## 2023-10-06 RX ADMIN — ACETAMINOPHEN 1000 MG: 500 TABLET ORAL at 08:10

## 2023-10-06 RX ADMIN — IBUPROFEN 800 MG: 800 TABLET, FILM COATED ORAL at 08:10

## 2023-10-06 NOTE — Clinical Note
"Eusebia"Rebecca Noel was seen and treated in our emergency department on 10/6/2023.  She may return to work on 10/09/2023.       If you have any questions or concerns, please don't hesitate to call.      Luis Price MD"

## 2023-10-06 NOTE — ED PROVIDER NOTES
Encounter Date: 10/6/2023       History     Chief Complaint   Patient presents with    Back Pain     Eusebia Noel is a 28 y.o. 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     Review of patient's allergies indicates:  No Known Allergies  History reviewed. No pertinent past medical history.  Past Surgical History:   Procedure Laterality Date     SECTION       Family History   Problem Relation Age of Onset    No Known Problems Mother     No Known Problems Father      Social History     Tobacco Use    Smoking status: Never    Smokeless tobacco: Never   Substance Use Topics    Alcohol use: Yes     Comment: occ     Review of Systems   Constitutional: Negative.    HENT: Negative.     Eyes: Negative.    Respiratory: Negative.     Cardiovascular: Negative.    Gastrointestinal: Negative.    Genitourinary: Negative.    Musculoskeletal:  Positive for back pain.   Skin: Negative.    Neurological: Negative.    Psychiatric/Behavioral: Negative.         Physical Exam     Initial Vitals   BP Pulse Resp Temp SpO2   10/06/23 0820 10/06/23 0820 10/06/23 0820 10/06/23 0825 10/06/23 0820   (!) 167/85 82 18 97.5 °F (36.4 °C) 100 %      MAP       --                Physical Exam    Nursing note and vitals reviewed.  Constitutional: Vital signs are normal. She appears well-developed and well-nourished. She is cooperative.   HENT:   Head: Normocephalic and atraumatic.   Eyes: Conjunctivae, EOM and lids are normal. Pupils are equal, round, and reactive to light.    Neck: Trachea normal and phonation normal. Neck supple. No JVD present.   Normal range of motion.   Full passive range of motion without pain.     Cardiovascular:  Normal rate, regular rhythm, S1 normal, S2 normal, normal heart sounds, intact distal pulses and normal pulses.           Pulmonary/Chest: Effort normal and breath sounds normal.   Abdominal: Abdomen is soft and flat. Bowel sounds are normal.   Musculoskeletal:         General: Normal range of motion.      Cervical back: Full passive range of motion without pain, normal range of motion and neck supple.     Neurological: She is alert and oriented to person, place, and time. She has normal strength.   Skin: Skin is warm, dry and intact. Capillary refill takes less than 2 seconds.         ED Course   Procedures  Labs Reviewed   DRUG SCREEN PANEL, URINE EMERGENCY - Abnormal; Notable for the following components:       Result Value    THC Presumptive Positive (*)     All other components within normal limits    Narrative:     Specimen Source->Urine   URINALYSIS, REFLEX TO URINE CULTURE    Narrative:     Specimen Source->Urine   PREGNANCY TEST, URINE RAPID    Narrative:     Specimen Source->Urine          Imaging Results              X-Ray Lumbar Spine Ap And Lateral (In process)                      Medications   acetaminophen tablet 1,000 mg (1,000 mg Oral Given 10/6/23 0854)   ibuprofen tablet 800 mg (800 mg Oral Given 10/6/23 0856)     Medical Decision Making  28-year-old female with right-sided sciatica with any heavy lifting at work  Differential includes sprain, strain, sciatica, degenerative disc disease  Differential also includes lumbar fracture, chronic pain, malingering    Problems Addressed:  Acute right-sided low back pain with right-sided sciatica: complicated acute illness or injury    Amount and/or Complexity of Data Reviewed  Labs: ordered. Decision-making details documented in ED Course.  Radiology: ordered and independent interpretation  performed.    ED Management & Risk of Complications, Morbidity, Mortality:  X-rays were unrevealing with a (-) UA UPT in the emergency room today  Injections given the ER with a prescription for anti-inflammatories on DC  Muscle relaxer prescription as well as steroids with PCP follow-up 48 hours  No heavy lifting, back rest, heating pad, ice going forward on ER discharge  This patient does not need to be hospitalized on ER evaluation today  The patient's diagnosis is not limited by social determinants of health  Does not require surgery or procedure (major or minor), no risk factors  Pt/Family counseled to return to the ER with any concerning symptoms       Clinical Impression:   Final diagnoses:  [M54.41] Acute right-sided low back pain with right-sided sciatica (Primary)        ED Disposition Condition    Discharge Stable          ED Prescriptions       Medication Sig Dispense Start Date End Date Auth. Provider    ibuprofen (ADVIL,MOTRIN) 800 MG tablet Take 1 tablet (800 mg total) by mouth every 6 (six) hours as needed for Pain. 20 tablet 10/6/2023 -- Luis Price MD    gabapentin (NEURONTIN) 100 MG capsule Take 1 capsule (100 mg total) by mouth 3 (three) times daily. 90 capsule 10/6/2023 10/5/2024 Luis Price MD    predniSONE (DELTASONE) 20 MG tablet Take 2 tablets (40 mg total) by mouth once daily. for 5 days 10 tablet 10/6/2023 10/11/2023 Luis Price MD          Follow-up Information       Follow up With Specialties Details Why Contact Info    Gerardo Osullivan MD Family Medicine Go in 2 days  [address] [facility] [address]  [phone]      Eduard Reyna MD Pain Medicine Go in 2 days  [address] [facility] [address]  [phone]               Luis Price MD  10/06/23 1007

## 2024-04-24 ENCOUNTER — HOSPITAL ENCOUNTER (EMERGENCY)
Facility: HOSPITAL | Age: 29
Discharge: HOME | End: 2024-04-24
Attending: EMERGENCY MEDICINE
Payer: OTHER GOVERNMENT

## 2024-04-24 VITALS
HEIGHT: 64 IN | HEART RATE: 82 BPM | DIASTOLIC BLOOD PRESSURE: 82 MMHG | TEMPERATURE: 98 F | WEIGHT: 218.94 LBS | RESPIRATION RATE: 16 BRPM | OXYGEN SATURATION: 97 % | SYSTOLIC BLOOD PRESSURE: 139 MMHG | BODY MASS INDEX: 37.38 KG/M2

## 2024-04-24 DIAGNOSIS — R07.89 CHEST WALL PAIN: Primary | ICD-10-CM

## 2024-04-24 LAB
B-HCG UR QL: NEGATIVE
BUN SERPL-MCNC: 18 MG/DL (ref 6–30)
CHLORIDE SERPL-SCNC: 106 MMOL/L (ref 95–110)
CREAT SERPL-MCNC: 0.9 MG/DL (ref 0.5–1.4)
CTP QC/QA: YES
GLUCOSE SERPL-MCNC: 138 MG/DL (ref 70–110)
HCT VFR BLD CALC: 45 %PCV (ref 36–54)
HCV AB SERPL QL IA: NORMAL
HIV 1+2 AB+HIV1 P24 AG SERPL QL IA: NORMAL
OHS QRS DURATION: 80 MS
OHS QTC CALCULATION: 432 MS
POC IONIZED CALCIUM: 1.09 MMOL/L (ref 1.06–1.42)
POC TCO2 (MEASURED): 24 MMOL/L (ref 23–29)
POTASSIUM BLD-SCNC: 4.9 MMOL/L (ref 3.5–5.1)
SAMPLE: ABNORMAL
SODIUM BLD-SCNC: 136 MMOL/L (ref 136–145)
TROPONIN I SERPL DL<=0.01 NG/ML-MCNC: <0.006 NG/ML (ref 0–0.03)

## 2024-04-24 PROCEDURE — 93005 ELECTROCARDIOGRAM TRACING: CPT

## 2024-04-24 PROCEDURE — 81025 URINE PREGNANCY TEST: CPT | Performed by: EMERGENCY MEDICINE

## 2024-04-24 PROCEDURE — 87389 HIV-1 AG W/HIV-1&-2 AB AG IA: CPT | Performed by: PHYSICIAN ASSISTANT

## 2024-04-24 PROCEDURE — 84484 ASSAY OF TROPONIN QUANT: CPT | Performed by: EMERGENCY MEDICINE

## 2024-04-24 PROCEDURE — 99285 EMERGENCY DEPT VISIT HI MDM: CPT | Mod: 25

## 2024-04-24 PROCEDURE — 86803 HEPATITIS C AB TEST: CPT | Performed by: PHYSICIAN ASSISTANT

## 2024-04-24 PROCEDURE — 80048 BASIC METABOLIC PNL TOTAL CA: CPT

## 2024-04-24 PROCEDURE — 96372 THER/PROPH/DIAG INJ SC/IM: CPT | Performed by: EMERGENCY MEDICINE

## 2024-04-24 PROCEDURE — 25000003 PHARM REV CODE 250: Performed by: EMERGENCY MEDICINE

## 2024-04-24 PROCEDURE — 93010 ELECTROCARDIOGRAM REPORT: CPT | Mod: ,,, | Performed by: INTERNAL MEDICINE

## 2024-04-24 PROCEDURE — 63600175 PHARM REV CODE 636 W HCPCS: Performed by: EMERGENCY MEDICINE

## 2024-04-24 RX ORDER — KETOROLAC TROMETHAMINE 30 MG/ML
15 INJECTION, SOLUTION INTRAMUSCULAR; INTRAVENOUS
Status: COMPLETED | OUTPATIENT
Start: 2024-04-24 | End: 2024-04-24

## 2024-04-24 RX ORDER — ALUMINUM HYDROXIDE, MAGNESIUM HYDROXIDE, AND SIMETHICONE 1200; 120; 1200 MG/30ML; MG/30ML; MG/30ML
30 SUSPENSION ORAL ONCE
Status: COMPLETED | OUTPATIENT
Start: 2024-04-24 | End: 2024-04-24

## 2024-04-24 RX ORDER — LIDOCAINE 50 MG/G
1 PATCH TOPICAL
Status: DISCONTINUED | OUTPATIENT
Start: 2024-04-24 | End: 2024-04-24 | Stop reason: HOSPADM

## 2024-04-24 RX ORDER — LIDOCAINE HYDROCHLORIDE 20 MG/ML
15 SOLUTION OROPHARYNGEAL ONCE
Status: COMPLETED | OUTPATIENT
Start: 2024-04-24 | End: 2024-04-24

## 2024-04-24 RX ADMIN — KETOROLAC TROMETHAMINE 15 MG: 30 INJECTION, SOLUTION INTRAMUSCULAR; INTRAVENOUS at 07:04

## 2024-04-24 RX ADMIN — LIDOCAINE HYDROCHLORIDE 15 ML: 20 SOLUTION ORAL at 07:04

## 2024-04-24 RX ADMIN — ALUMINUM HYDROXIDE, MAGNESIUM HYDROXIDE, AND SIMETHICONE 30 ML: 1200; 120; 1200 SUSPENSION ORAL at 07:04

## 2024-04-24 RX ADMIN — LIDOCAINE 5% 1 PATCH: 700 PATCH TOPICAL at 07:04

## 2024-04-24 NOTE — Clinical Note
"Eusebia Juarez"Bert was seen and treated in our emergency department on 4/24/2024.  She may return to work on 04/25/2024.       If you have any questions or concerns, please don't hesitate to call.      Samantha Cloud MD"

## 2024-04-24 NOTE — ED PROVIDER NOTES
Encounter Date: 2024       History     Chief Complaint   Patient presents with    Chest Pain     Woke up with epigastric chest pain located between breasts     Eusebia Noel is a 28-year-old female with no significant past medical history presenting today for chest pain.  Symptoms started when she woke up this morning.  It is located in the middle of her breast and radiates to the mid chest.  It has been constant without radiation to the back, jaw or arms.  It is worse with lying flat.  Reports nausea several days ago that she thinks is secondary to her menstrual cycle, that is resolved.  Denies fever, chills.  Has not had pain similar to this in the past.  No recent URI.  Has not taken any medication for that is prior to arrival.      Review of patient's allergies indicates:  No Known Allergies  No past medical history on file.  Past Surgical History:   Procedure Laterality Date     SECTION       Family History   Problem Relation Name Age of Onset    No Known Problems Mother      No Known Problems Father       Social History     Tobacco Use    Smoking status: Never    Smokeless tobacco: Never   Substance Use Topics    Alcohol use: Yes     Comment: occ     Review of Systems    Physical Exam     Initial Vitals [24 0611]   BP Pulse Resp Temp SpO2   (!) 161/91 87 16 97.6 °F (36.4 °C) 100 %      MAP       --         Physical Exam    Nursing note and vitals reviewed.  Constitutional: She appears well-developed and well-nourished. No distress.   HENT:   Mouth/Throat: Oropharynx is clear and moist.   Eyes: Conjunctivae are normal. No scleral icterus.   Neck: No JVD present.   Cardiovascular:  Normal rate, regular rhythm and intact distal pulses.           Pulmonary/Chest: Breath sounds normal. No respiratory distress. She has no wheezes. She has no rales. She exhibits tenderness (Reproducible chest wall tenderness in the middle of the breast).   Abdominal: Abdomen is soft. Bowel sounds are normal.  She exhibits no distension. There is abdominal tenderness (Mild epigastric tenderness, negative Johnson sign). There is no rebound.   Musculoskeletal:         General: No edema.     Lymphadenopathy:     She has no cervical adenopathy.   Neurological: She is alert and oriented to person, place, and time.   Skin: Skin is warm. No rash noted.         ED Course   Procedures  Labs Reviewed   ISTAT PROCEDURE - Abnormal; Notable for the following components:       Result Value    POC Glucose 138 (*)     All other components within normal limits   HIV 1 / 2 ANTIBODY    Narrative:     Release to patient->Immediate   HEPATITIS C ANTIBODY    Narrative:     Release to patient->Immediate   TROPONIN I   POCT URINE PREGNANCY     EKG Readings: (Independently Interpreted)   EKG: Sinus rhythm at 81 with normal intervals, normal axis, no acute ischemic changes.     ECG Results              EKG 12-lead (Final result)        Collection Time Result Time QRS Duration OHS QTC Calculation    04/24/24 06:14:33 04/24/24 09:55:13 80 432                     Final result by Interface, Lab In [facility] (04/24/24 09:55:21)                   Narrative:    Test Reason : R07.9,    Vent. Rate : 081 BPM     Atrial Rate : 081 BPM     P-R Int : 166 ms          QRS Dur : 080 ms      QT Int : 372 ms       P-R-T Axes : 053 087 065 degrees     QTc Int : 432 ms    Normal sinus rhythm  Normal ECG  No previous ECGs available  Confirmed by KANDICE RIGGINS MD ([id number]) on 4/24/2024 9:55:12 AM    Referred By: [email or web address]   SELF           Confirmed By:KANDICE RIGGINS MD                                  Imaging Results              X-Ray Chest PA And Lateral (Final result)  Result time 04/24/24 07:29:09      Final result by Gabriel Kay MD (04/24/24 07:29:09)                   Impression:      No acute abnormality.      Electronically signed by: Gabriel Kay MD  Date:    04/24/2024  Time:    07:29               Narrative:    EXAMINATION:  XR CHEST PA AND  LATERAL    CLINICAL HISTORY:  chest pain;    TECHNIQUE:  PA and lateral views of the chest were performed.    COMPARISON:  None    FINDINGS:  The lungs are clear, with normal appearance of pulmonary vasculature.No pleural effusion.No pneumothorax.    The cardiac silhouette is normal in size. The hilar and mediastinal contours are unremarkable.    Bones are intact.                                       Medications   aluminum-magnesium hydroxide-simethicone 200-200-20 mg/5 mL suspension 30 mL (30 mLs Oral Given 4/24/24 0701)     And   LIDOcaine viscous HCl 2% oral solution 15 mL (15 mLs Oral Given 4/24/24 0702)   ketorolac injection 15 mg (15 mg Intramuscular Given 4/24/24 0753)     Medical Decision Making  Emergent evaluation a 28-year-old female here for mid chest pain started shortly present when waking up, worse with lying flat and constant.    Hypertensive otherwise vital signs are stable.  Overall well-appearing, no acute distress.  Reproducible chest wall tenderness on exam.  Mild epigastric tenderness noted, negative Johnson's sign.    Differential includes but not limited to gastritis, peptic ulcer disease, GERD, pericarditis, MSK pain, less likely ACS    Plan for EKG, troponin, chest x-ray, GI cocktail    Amount and/or Complexity of Data Reviewed  Labs: ordered. Decision-making details documented in ED Course.  Radiology: ordered and independent interpretation performed. Decision-making details documented in ED Course.  ECG/medicine tests: ordered and independent interpretation performed. Decision-making details documented in ED Course.    Risk  OTC drugs.  Prescription drug management.               ED Course as of 04/24/24 1229   Wed Apr 24, 2024   0700 hCG Qualitative, Urine: Negative [GM]   0715 POC BUN: 18 [GM]   0715 POC Creatinine: 0.9 [GM]   0737 CXR reviewed and independently interpreted by me as no acute process. [GM]   0748 Troponin I: <0.006 [GM]   0751 Labs reviewed with no significant  electrolyte derangements.  Troponin negative.  EKG without arrhythmia or ischemia.  Chest x-ray negative.  Chest pain is reproducible, likely musculoskeletal.  Will give Toradol IM, lidocaine patch.  Return precautions given.  Follow up with PCP if symptoms persist. [GM]      ED Course User Index  [GM] Samantha Cloud MD                           Clinical Impression:  Final diagnoses:  [R07.89] Chest wall pain (Primary)          ED Disposition Condition    Discharge Stable          ED Prescriptions    None       Follow-up Information       Follow up With Specialties Details Why Contact Info Additional Information    [facility] - Emergency Dept Emergency Medicine  If symptoms worsen [address] [facility] [id number] [phone]     [facility] Internal Medicine In 1 week  [address] [facility] [id number] [phone] [facility] Please park in surface lot and check in at central registration desk             Samantha Cloud MD  04/24/24 5695

## 2024-04-24 NOTE — DISCHARGE INSTRUCTIONS
Your workup today is unremarkable.  Continue to take Tylenol ibuprofen as needed for pain.  I have given you a lidocaine patch, if you find that helpful, you can get those over-the-counter at the pharmacy.  Return for feeling weak, lightheaded, dizzy, passing out or any other concerning issues.

## 2024-04-24 NOTE — ED NOTES
I-STAT Chem-8+ Results:   Value Reference Range   Sodium 136 136-145 mmol/L   Potassium  4.9 3.5-5.1 mmol/L   Chloride 106  mmol/L   Ionized Calcium 1.09 1.06-1.42 mmol/L   CO2 (measured) 24 23-29 mmol/L   Glucose 138  mg/dL   BUN 18 6-30 mg/dL   Creatinine 0.9 0.5-1.4 mg/dL   Hematocrit 45 36-54%

## 2024-07-07 ENCOUNTER — OFFICE VISIT (OUTPATIENT)
Dept: URGENT CARE | Facility: CLINIC | Age: 29
End: 2024-07-07
Payer: OTHER GOVERNMENT

## 2024-07-07 VITALS
SYSTOLIC BLOOD PRESSURE: 161 MMHG | WEIGHT: 218 LBS | RESPIRATION RATE: 16 BRPM | TEMPERATURE: 98 F | HEART RATE: 97 BPM | BODY MASS INDEX: 37.42 KG/M2 | DIASTOLIC BLOOD PRESSURE: 85 MMHG | OXYGEN SATURATION: 97 %

## 2024-07-07 DIAGNOSIS — B34.9 ACUTE VIRAL SYNDROME: Primary | ICD-10-CM

## 2024-07-07 LAB
CTP QC/QA: YES
CTP QC/QA: YES
POC MOLECULAR INFLUENZA A AGN: NEGATIVE
POC MOLECULAR INFLUENZA B AGN: NEGATIVE
SARS-COV-2 AG RESP QL IA.RAPID: NEGATIVE

## 2024-07-07 PROCEDURE — 87502 INFLUENZA DNA AMP PROBE: CPT | Mod: QW,S$GLB,, | Performed by: FAMILY MEDICINE

## 2024-07-07 PROCEDURE — 99213 OFFICE O/P EST LOW 20 MIN: CPT | Mod: S$GLB,,, | Performed by: FAMILY MEDICINE

## 2024-07-07 PROCEDURE — 87811 SARS-COV-2 COVID19 W/OPTIC: CPT | Mod: QW,S$GLB,, | Performed by: FAMILY MEDICINE

## 2024-07-07 NOTE — PROGRESS NOTES
"Subjective:      Patient ID: Eusebia Noel is a 29 y.o. female.    Vitals:  weight is 98.9 kg (218 lb). Her oral temperature is 98.4 °F (36.9 °C). Her blood pressure is 161/85 (abnormal) and her pulse is 97. Her respiration is 16 and oxygen saturation is 97%.     Chief Complaint: headache and body aches    This is a 29 y.o. female who presents today with a chief complaint of body aches, H/A (photophobic, phonophobic, frontal px, eye px), nausea, "feel dehydrated", nasal congestion in the morning, chills, sweats, decreased appetite, fatigue, weakness or ear ache (tenderness) since last night. No cough, fever, sore throat, ear congestion, vomiting, diarrhea or abd px.    Home tx: advil (last dose this morning 0700); electrolyte drink    PPMH: none    Headache   This is a new problem. The current episode started yesterday. The problem occurs constantly. The problem has been gradually worsening. The pain is located in the Frontal region. The pain radiates to the upper back. The pain quality is similar to prior headaches. The quality of the pain is described as sharp (pressure). The pain is at a severity of 9/10. The pain is severe. Associated symptoms include anorexia, back pain, ear pain, eye pain, eye watering, muscle aches, nausea, neck pain, phonophobia and photophobia. Pertinent negatives include no abdominal pain, blurred vision, coughing, fever, rhinorrhea, scalp tenderness, sinus pressure, sore throat, tinnitus or vomiting. There is no history of hypertension, migraine headaches or sinus disease.       Constitution: Negative for fever.   HENT:  Positive for ear pain. Negative for tinnitus, sinus pressure and sore throat.    Neck: Positive for neck pain.   Eyes:  Positive for eye pain and photophobia. Negative for blurred vision.   Respiratory:  Negative for cough.    Gastrointestinal:  Positive for nausea. Negative for abdominal pain and vomiting.   Musculoskeletal:  Positive for back pain. "   Neurological:  Positive for headaches. Negative for history of migraines.      Objective:     Physical Exam   Constitutional: She does not appear ill. No distress. obesity  HENT:   Head: Normocephalic and atraumatic.   Nose: No rhinorrhea or congestion.   Mouth/Throat: Mucous membranes are moist. Posterior oropharyngeal erythema (mild erythema) present.   Eyes: Pupils are equal, round, and reactive to light.   Cardiovascular: Normal rate, regular rhythm, normal heart sounds and normal pulses.   Pulmonary/Chest: Effort normal and breath sounds normal.   Abdominal: Normal appearance. Soft.   Neurological: She is alert.   Nursing note and vitals reviewed.    Results for orders placed or performed in visit on 07/07/24   SARS Coronavirus 2 Antigen, POCT Manual Read   Result Value Ref Range    SARS Coronavirus 2 Antigen Negative Negative     Acceptable Yes    POCT Influenza A/B MOLECULAR   Result Value Ref Range    POC Molecular Influenza A Ag Negative Negative    POC Molecular Influenza B Ag Negative Negative     Acceptable Yes         Assessment:     1. Acute viral syndrome        Plan:       Acute viral syndrome  -     SARS Coronavirus 2 Antigen, POCT Manual Read  -     POCT Influenza A/B MOLECULAR      To treat symptomatically. Consider retesting if symptoms persists or worsens

## 2024-07-18 NOTE — ED PROVIDER NOTES
Airway  Date/Time: 7/17/2024 8:57 PM  Urgency: elective    Airway not difficult    General Information and Staff    Patient location during procedure: OR  Anesthesiologist: Cb Gonzales MD  Performed: anesthesiologist   Performed by: Cb Gonzales MD  Authorized by: Cb Gonzales MD      Indications and Patient Condition  Indications for airway management: anesthesia  Sedation level: deep  Preoxygenated: yes  Patient position: sniffing  MILS maintained throughout  Mask difficulty assessment: 0 - not attempted    Final Airway Details  Final airway type: endotracheal airway      Successful airway: ETT  Cuffed: yes   Successful intubation technique: direct laryngoscopy  Facilitating devices/methods: rapid sequence intubation and cricoid pressure  Endotracheal tube insertion site: oral  Blade: Dave  Blade size: #3  ETT size (mm): 7.5    Cormack-Lehane Classification: grade I - full view of glottis  Placement verified by: capnometry   Cuff volume (mL): 7  Measured from: lips  ETT to lips (cm): 23  Number of attempts at approach: 1         Encounter Date: 2022       History     Chief Complaint   Patient presents with    General Illness     Pt arrived to ED with c/o bilateral ear pain and a scratchy throat for about 3 days. NADN in triage.      27-year-old female presents with bilateral earache & a scratchy throat for 3 days  Patient on exam has mild bilateral otitis media with mild pharyngitis noted  No stridor or drooling with normal phonation & normal swallowing in the ER   Patient with normal speech, no halitosis, no hot potato voice noted in the ED  Patient has fluid behind the ears with mild otitis media, no externa noted now  Patient denies any pregnancy risk, states that she just had recent ear infection    Review of patient's allergies indicates:  No Known Allergies    No past medical history on file.  Past Surgical History:   Procedure Laterality Date     SECTION       Family History   Problem Relation Age of Onset    No Known Problems Mother     No Known Problems Father      Social History     Tobacco Use    Smokeless tobacco: Never   Substance Use Topics    Alcohol use: Not Currently       Review of Systems   Constitutional:  Positive for fatigue.   HENT:  Positive for ear pain and sore throat.    Eyes: Negative.    Respiratory: Negative.     Cardiovascular: Negative.    Gastrointestinal: Negative.    Genitourinary:  Negative for dysuria, urgency and vaginal discharge.   Skin: Negative.    Neurological: Negative.    Psychiatric/Behavioral: Negative.     All other systems reviewed and are negative.    Physical Exam     Initial Vitals   BP Pulse Resp Temp SpO2   22 0355 22 0355 22 0355 22 0357 22 0355   (!) 174/104 81 20 96.8 °F (36 °C) 98 %      MAP       --                Physical Exam    Nursing note and vitals reviewed.  Constitutional: Vital signs are normal. She appears well-developed and well-nourished. She is cooperative.   HENT:   Head: Normocephalic and atraumatic.   Nose: Nose normal.    Bilateral otitis media with no drainage and a normal canal; normal hearing  mild posterior oropharnyx erythema with no exudate or signs of tonsillitis        Eyes: Conjunctivae, EOM and lids are normal. Pupils are equal, round, and reactive to light.   Neck: Trachea normal and phonation normal. Neck supple. No JVD present.   Normal range of motion.   Full passive range of motion without pain.     Cardiovascular:  Normal rate, regular rhythm, S1 normal, S2 normal, normal heart sounds, intact distal pulses and normal pulses.           Pulmonary/Chest: Effort normal and breath sounds normal.   Abdominal: Abdomen is soft and flat. Bowel sounds are normal.   Musculoskeletal:         General: Normal range of motion.      Cervical back: Full passive range of motion without pain, normal range of motion and neck supple.     Neurological: She is alert and oriented to person, place, and time. She has normal strength.   Skin: Skin is warm, dry and intact. Capillary refill takes less than 2 seconds.       ED Course   Procedures  Labs Reviewed   INFLUENZA A & B BY MOLECULAR   GROUP A STREP, MOLECULAR   SARS-COV-2 RNA AMPLIFICATION, QUAL          Imaging Results    None          Medications   penicillin G benzathine (BICILLIN LA) injection 1.2 Million Units (1.2 Million Units Intramuscular Given 22)   ketorolac injection 60 mg (60 mg Intramuscular Given 22)     Medical Decision Makin-year-old female presents with a sore throat in the emergency room today  Patient also with mild bilateral otitis media on clinical examination today  IM Bicillin, IM Toradol given in the emergency room this early morning  Flu, strep, COVID swabs were (-); treat pharyngitis & otitis media clinically  Prescriptions given on discharge for amoxicillin & Motrin, PCP follow-up  Recommended follow-up in next 48 hours return with any concerns                         Clinical Impression:   Final diagnoses:  [H66.93] Acute otitis  media, bilateral (Primary)  [J00] Acute nasopharyngitis        ED Disposition Condition    Discharge Stable          ED Prescriptions       Medication Sig Dispense Start Date End Date Auth. Provider    amoxicillin (AMOXIL) 875 MG tablet Take 1 tablet (875 mg total) by mouth 2 (two) times daily. for 7 days 14 tablet 12/11/2022 12/18/2022 Luis Price MD    ibuprofen (ADVIL,MOTRIN) 800 MG tablet Take 1 tablet (800 mg total) by mouth every 6 (six) hours as needed for Pain. 20 tablet 12/11/2022 -- Luis Price MD          Follow-up Information       Follow up With Specialties Details Why Contact Info    Kentrell Alvarez MD Otolaryngology Go in 2 days  [address]  [address] FLO[address]  [address] [address]  [phone]                    Luis Price MD  12/11/22 0514

## 2025-06-02 ENCOUNTER — OFFICE VISIT (OUTPATIENT)
Dept: URGENT CARE | Facility: CLINIC | Age: 30
End: 2025-06-02
Payer: OTHER GOVERNMENT

## 2025-06-02 VITALS
SYSTOLIC BLOOD PRESSURE: 139 MMHG | DIASTOLIC BLOOD PRESSURE: 82 MMHG | HEIGHT: 64 IN | HEART RATE: 74 BPM | RESPIRATION RATE: 17 BRPM | BODY MASS INDEX: 38.93 KG/M2 | WEIGHT: 228 LBS | OXYGEN SATURATION: 97 % | TEMPERATURE: 98 F

## 2025-06-02 DIAGNOSIS — J02.9 SORE THROAT: ICD-10-CM

## 2025-06-02 DIAGNOSIS — J00 ACUTE NASOPHARYNGITIS: Primary | ICD-10-CM

## 2025-06-02 LAB
CTP QC/QA: YES
CTP QC/QA: YES
MOLECULAR STREP A: NEGATIVE
SARS-COV+SARS-COV-2 AG RESP QL IA.RAPID: NEGATIVE

## 2025-06-02 PROCEDURE — 87811 SARS-COV-2 COVID19 W/OPTIC: CPT | Mod: QW,S$GLB,,

## 2025-06-02 PROCEDURE — 87651 STREP A DNA AMP PROBE: CPT | Mod: QW,S$GLB,,

## 2025-06-02 PROCEDURE — 99213 OFFICE O/P EST LOW 20 MIN: CPT | Mod: S$GLB,,,

## 2025-06-02 RX ORDER — CETIRIZINE HYDROCHLORIDE 10 MG/1
10 TABLET ORAL DAILY
Qty: 30 TABLET | Refills: 0 | Status: SHIPPED | OUTPATIENT
Start: 2025-06-02